# Patient Record
Sex: FEMALE | Race: OTHER
[De-identification: names, ages, dates, MRNs, and addresses within clinical notes are randomized per-mention and may not be internally consistent; named-entity substitution may affect disease eponyms.]

---

## 2019-04-30 ENCOUNTER — HOSPITAL ENCOUNTER (EMERGENCY)
Dept: HOSPITAL 80 - FED | Age: 39
Discharge: HOME | End: 2019-04-30
Payer: COMMERCIAL

## 2019-04-30 VITALS — DIASTOLIC BLOOD PRESSURE: 81 MMHG | SYSTOLIC BLOOD PRESSURE: 128 MMHG

## 2019-04-30 DIAGNOSIS — M25.512: ICD-10-CM

## 2019-04-30 DIAGNOSIS — M54.2: Primary | ICD-10-CM

## 2019-04-30 DIAGNOSIS — E86.9: ICD-10-CM

## 2019-04-30 DIAGNOSIS — R05: ICD-10-CM

## 2019-04-30 LAB — PLATELET # BLD: 231 10^3/UL (ref 150–400)

## 2019-04-30 NOTE — EDPHY
H & P


Stated Complaint: cold s/s x days. coughed hard, now back pain arm pain and HA


Time Seen by Provider: 04/30/19 03:26


HPI/ROS: 





HPI





CHIEF COMPLAINT:  Worsening cough, left posterior neck and shoulder pain.





HISTORY OF PRESENT ILLNESS:  39-year-old female otherwise healthy, no 

significant medical history presents to the emergency room with left posterior 

neck and shoulder pain.  She states this started approximately 2 hr ago she was 

coughing vigorously and developed this pain.  She states she coughed really 

hard and suddenly developed this sharp stabbing pain from her left lateral 

posterior neck to the left posterior shoulder.  She denies any pleuritic pain 

when she breathes in.  She states she has been sick for the past 4-5 days with 

cough and congestion.  She reports that her cough is rather nonproductive.  She 

went to her primary care doctor's office yesterday and was given Mucinex 

albuterol inhaler and cough medicine.  She states she really has not improved 

that much.  She coughed very hard tonight developed this pain this what 

prompted her to come to the emergency room.  She describes it as sharp stabbing

, left post scapula, left posterior neck, paravertebral, region. Denies left 

sided chest pain or pleuritic. Denies abdominal pain, denies Headache. 





Of note patient is Swedish-speaking only,  was used for 

history review of systems.





Past Medical History:  Denies medical history





Past Surgical History:  Denies surgical history





Social History:  Denies drugs alcohol tobacco.





Family History:  Denies significant cardiovascular history in the family.








ROS   


REVIEW OF SYSTEMS:


10 Systems were reviewed and negative with the exception of the elements 

mentioned in the history of present illness.








Exam   


Constitutional   triage nursing summary reviewed, vital signs reviewed, awake/

alert. 


Eyes   normal conjunctivae and sclera, EOMI, PERRLA. 


HENT neck exam:  No significant midline tenderness or step-offs, does have some 

mild paravertebral cervical spine pain down into left trapezius region, 

reproducible on exam with tender palpation, worse with left arm movement 

especially when she resist against abduction, and when she fully abducts her 

arm inward she gets left posterior scapula left neck pain, reproducible on exam

  moist mucus membranes, no epistaxis, neck supple/ no meningismus, no raccoon 

eyes. 


Respiratory   clear to auscultation bilaterally, normal breath sounds, no 

respiratory distress, no wheezing. 


Cardiovascular   rate normal, regular rhythm, no murmur, no edema, distal 

pulses normal. 


Gastrointestinal   soft, non-tender, no rebound, no guarding, normal bowel 

sounds, no distension, no pulsatile mass. 


Genitourinary   no CVA tenderness. 


Musculoskeletal  No bruit on exam, no midline vertebral tenderness, full range 

of motion, no calf swelling, no tenderness of extremities, no meningismus, good 

pulses, neurovascularly intact.


Skin   pink, warm, & dry, no rash, skin atraumatic. 


Neurologic  normal neurological exam, no focal neurological abnormality, awake, 

alert and oriented x 3, AAOx3, moves all 4 extremities equally, motor intact, 

sensory intact, CN II-XII intact, normal cerebellar, normal vision, normal 

speech. 


Psychiatric   normal mood/affect. 


Heme/Lymph/Immune   no lymphadenopathy.





Differential Diagnosis:  Includes but is not limited to in a particular order 

musculoskeletal strain, muscle spasm, cervical strain, nerve root compression, 

annular tear, cardiac related pain, pneumothorax, pneumonia with PE





Medical Decision Making:  Plan for this patient IV establishment IV fluid bolus 

IV Toradol for pain control, basic labs, EKG, troponin, chest x-ray and re-

evaluate.





Re-evaluation:








EKG interpretation by me on record in [facility] system.  Impression time of 

EKG 3:51 a.m., sinus rhythm rate of 85 without any signs of acute ischemia.





Trop 0.00


Ddimer Negative


Ekg negative








Repeat EKG time:  6:30 a.m., sinus rhythm rate of 92 with no signs of acute 

ischemia.





0707AM:  Patient re-examined at this time resting comfortably no acute 

distress.  She denies any chest pain or shortness of breath, denies any 

pleuritic pain.  Denies headache.  She does still have some mild left posterior 

neck and shoulder pain reproducible on exam specially when she ranges her left 

shoulder and when you press on her left posterior scapula.  It is reproducible 

on exam she does feel much better after IV Toradol.





She had 2 troponins that are negative.


She has a negative D-dimer


EKGs that are not acutely ischemic.


And feels much better after IV Toradol.





At 7:08 a.m. I discussed all for test results with her with  

went over return precautions she feels comfortable going home.  I do recommend 

she return to the emergency room she develops any worsening pain she is 

comfortable this plan.  I do recommend anti-inflammatory pain medicine and ice.





She is comfortable this and return precautions discussed.





Source: Patient





- Personal History


Current Tetanus/Diphtheria Vaccine: Unsure


Current Tetanus Diphtheria and Acellular Pertussis (TDAP): Unsure





- Medical/Surgical History


Hx Asthma: No


Hx Chronic Respiratory Disease: No


Hx Diabetes: No


Hx Cardiac Disease: No


Hx Renal Disease: No


Hx Cirrhosis: No


Hx Alcoholism: No


Hx HIV/AIDS: No


Hx Splenectomy or Spleen Trauma: No


Other PMH: denies





- Social History


Smoking Status: Never smoked


Constitutional: 


 Initial Vital Signs











Temperature (C)  36.7 C   04/30/19 03:21


 


Heart Rate  104 H  04/30/19 03:21


 


Respiratory Rate  20   04/30/19 03:21


 


Blood Pressure  140/91 H  04/30/19 03:21


 


O2 Sat (%)  96   04/30/19 03:21








 











O2 Delivery Mode               Room Air














Allergies/Adverse Reactions: 


 





No Known Allergies Allergy (Unverified 04/30/19 03:25)


 








Home Medications: 














 Medication  Instructions  Recorded


 


Albuterol Sulfate [Proair Hfa]  04/30/19


 


Benzonatate  04/30/19


 


Ibuprofen [Motrin (*)] 800 mg PO Q6-8PRN #10 tab 04/30/19


 


guaiFENesin [Guaifenesin]  04/30/19














Medical Decision Making





- Data Points


Laboratory Results: 


 Laboratory Results





 04/30/19 03:45 





 04/30/19 03:45 








Medications Given: 


 








Discontinued Medications





Sodium Chloride (Ns)  1,000 mls @ 0 mls/hr IV EDNOW ONE; Wide [facility]


   PRN Reason: Protocol


   Stop: 04/30/19 03:39


   Last Admin: 04/30/19 03:45 Dose:  1,000 mls


Ketorolac Tromethamine (Toradol)  15 mg IVP EDNOW ONE


   Stop: 04/30/19 03:39


   Last Admin: 04/30/19 03:45 Dose:  15 mg





Point of Care Test Results: 


 Chemistry











  04/30/19 04/30/19





  06:38 03:49


 


POC Troponin I  0.00 ng/mL ng/mL  0.00 ng/mL ng/mL





   (0.00-0.08)   (0.00-0.08) 














Departure





- Departure


Disposition: Home, Routine, Self-Care


Clinical Impression: 


 Neck pain





Condition: Good


Instructions:  Cervical Strain (ED), Neck Pain (ED), Acute Neck Pain (ED)


Additional Instructions: 


1. Recommend rest.


2. Anti-inflammatory pain medicine like Tylenol and/or Motrin


3. Ice.


4. Return to the emergency room if it is worse.


Referrals: 


Scott Rodgers PA [Primary Care Provider] - As per Instructions


Prescriptions: 


Ibuprofen [Motrin (*)] 800 mg PO Q6-8PRN #10 tab


Print Language: Swedish

## 2019-04-30 NOTE — CPEKG
Test Reason : OPEN

Blood Pressure : ***/*** mmHG

Vent. Rate : 092 BPM     Atrial Rate : 093 BPM

   P-R Int : 166 ms          QRS Dur : 087 ms

    QT Int : 349 ms       P-R-T Axes : 016 008 029 degrees

   QTc Int : 432 ms

 

Sinus rhythm

Left atrial enlargement

 

Confirmed by Mark John ([id number]) on 4/30/2019 8:02:53 AM

 

Referred By: Mark John           Confirmed By:Mark John

## 2019-04-30 NOTE — CPEKG
Test Reason : OPEN

Blood Pressure : ***/*** mmHG

Vent. Rate : 085 BPM     Atrial Rate : 085 BPM

   P-R Int : 161 ms          QRS Dur : 089 ms

    QT Int : 352 ms       P-R-T Axes : 042 016 025 degrees

   QTc Int : 419 ms

 

Sinus rhythm

Probable left atrial enlargement

 

Confirmed by Mark John ([id number]) on 4/30/2019 8:02:53 AM

 

Referred By: Mark John           Confirmed By:Mark John